# Patient Record
Sex: MALE
[De-identification: names, ages, dates, MRNs, and addresses within clinical notes are randomized per-mention and may not be internally consistent; named-entity substitution may affect disease eponyms.]

---

## 2017-02-07 ENCOUNTER — RX ONLY (RX ONLY)
Age: 52
End: 2017-02-07

## 2017-02-07 ENCOUNTER — DOCTOR'S OFFICE (OUTPATIENT)
Dept: URBAN - NONMETROPOLITAN AREA CLINIC 1 | Facility: CLINIC | Age: 52
Setting detail: OPHTHALMOLOGY
End: 2017-02-07
Payer: MEDICARE

## 2017-02-07 DIAGNOSIS — H04.122: ICD-10-CM

## 2017-02-07 DIAGNOSIS — H04.121: ICD-10-CM

## 2017-02-07 DIAGNOSIS — H25.013: ICD-10-CM

## 2017-02-07 PROCEDURE — 92004 COMPRE OPH EXAM NEW PT 1/>: CPT | Performed by: OPHTHALMOLOGY

## 2017-02-07 ASSESSMENT — REFRACTION_MANIFEST
OS_VA3: 20/
OU_VA: 20/
OD_VA3: 20/
OD_VA2: 20/
OD_VA2: 20/
OU_VA: 20/
OD_VA1: 20/
OD_VA1: 20/
OS_VA1: 20/
OU_VA: 20/
OD_VA3: 20/
OS_VA2: 20/
OS_VA3: 20/
OS_VA2: 20/
OS_VA1: 20/
OS_VA2: 20/
OS_VA3: 20/
OD_VA2: 20/
OS_VA1: 20/
OD_VA3: 20/
OD_VA1: 20/

## 2017-02-07 ASSESSMENT — VISUAL ACUITY
OS_BCVA: 20/30-2
OD_BCVA: 20/25-2

## 2017-02-07 ASSESSMENT — REFRACTION_CURRENTRX
OS_OVR_VA: 20/
OD_OVR_VA: 20/
OS_OVR_VA: 20/
OD_OVR_VA: 20/
OS_OVR_VA: 20/
OD_OVR_VA: 20/

## 2017-02-07 ASSESSMENT — SUPERFICIAL PUNCTATE KERATITIS (SPK)
OS_SPK: 1+
OD_SPK: 1+

## 2017-02-07 ASSESSMENT — CONFRONTATIONAL VISUAL FIELD TEST (CVF)
OS_FINDINGS: FULL
OD_FINDINGS: FULL

## 2017-02-07 ASSESSMENT — REFRACTION_AUTOREFRACTION
OD_SPHERE: +1.50
OS_CYLINDER: -0.50
OS_AXIS: 20
OD_CYLINDER: -0.50
OS_SPHERE: +0.75
OD_AXIS: 120

## 2017-02-07 ASSESSMENT — SPHEQUIV_DERIVED
OD_SPHEQUIV: 1.25
OS_SPHEQUIV: 0.5

## 2022-03-06 ENCOUNTER — APPOINTMENT (EMERGENCY)
Dept: RADIOLOGY | Facility: HOSPITAL | Age: 57
End: 2022-03-06
Payer: MEDICARE

## 2022-03-06 ENCOUNTER — APPOINTMENT (EMERGENCY)
Dept: CT IMAGING | Facility: HOSPITAL | Age: 57
End: 2022-03-06
Payer: MEDICARE

## 2022-03-06 ENCOUNTER — HOSPITAL ENCOUNTER (EMERGENCY)
Facility: HOSPITAL | Age: 57
Discharge: HOME | End: 2022-03-06
Attending: EMERGENCY MEDICINE | Admitting: EMERGENCY MEDICINE
Payer: MEDICARE

## 2022-03-06 VITALS
RESPIRATION RATE: 17 BRPM | DIASTOLIC BLOOD PRESSURE: 94 MMHG | TEMPERATURE: 98.2 F | SYSTOLIC BLOOD PRESSURE: 147 MMHG | OXYGEN SATURATION: 96 % | WEIGHT: 251.32 LBS | HEIGHT: 74 IN | HEART RATE: 85 BPM | BODY MASS INDEX: 32.25 KG/M2
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
## 2022-03-06 DIAGNOSIS — S22.39XA RIB FRACTURE: ICD-10-CM

## 2022-03-06 LAB
ANION GAP SERPL CALCULATED.3IONS-SCNC: 3 MMOL/L (ref 4–13)
BASOPHILS # BLD AUTO: 0.08 THOUSANDS/ΜL (ref 0–0.1)
BASOPHILS NFR BLD AUTO: 1 % (ref 0–1)
BUN SERPL-MCNC: 17 MG/DL (ref 5–25)
CALCIUM SERPL-MCNC: 8.7 MG/DL (ref 8.3–10.1)
CHLORIDE SERPL-SCNC: 100 MMOL/L (ref 100–108)
CO2 SERPL-SCNC: 29 MMOL/L (ref 21–32)
CREAT SERPL-MCNC: 0.97 MG/DL (ref 0.6–1.3)
EOSINOPHIL # BLD AUTO: 0.24 THOUSAND/ΜL (ref 0–0.61)
EOSINOPHIL NFR BLD AUTO: 3 % (ref 0–6)
ERYTHROCYTE [DISTWIDTH] IN BLOOD BY AUTOMATED COUNT: 12.9 % (ref 11.6–15.1)
GFR SERPL CREATININE-BSD FRML MDRD: 86 ML/MIN/1.73SQ M
GLUCOSE SERPL-MCNC: 99 MG/DL (ref 65–140)
HCT VFR BLD AUTO: 43 % (ref 36.5–49.3)
HGB BLD-MCNC: 14.4 G/DL (ref 12–17)
IMM GRANULOCYTES # BLD AUTO: 0.05 THOUSAND/UL (ref 0–0.2)
IMM GRANULOCYTES NFR BLD AUTO: 1 % (ref 0–2)
LYMPHOCYTES # BLD AUTO: 1.51 THOUSANDS/ΜL (ref 0.6–4.47)
LYMPHOCYTES NFR BLD AUTO: 17 % (ref 14–44)
MCH RBC QN AUTO: 29.6 PG (ref 26.8–34.3)
MCHC RBC AUTO-ENTMCNC: 33.5 G/DL (ref 31.4–37.4)
MCV RBC AUTO: 89 FL (ref 82–98)
MONOCYTES # BLD AUTO: 0.62 THOUSAND/ΜL (ref 0.17–1.22)
MONOCYTES NFR BLD AUTO: 7 % (ref 4–12)
NEUTROPHILS # BLD AUTO: 6.38 THOUSANDS/ΜL (ref 1.85–7.62)
NEUTS SEG NFR BLD AUTO: 71 % (ref 43–75)
NRBC BLD AUTO-RTO: 0 /100 WBCS
PLATELET # BLD AUTO: 263 THOUSANDS/UL (ref 149–390)
PMV BLD AUTO: 9.7 FL (ref 8.9–12.7)
POTASSIUM SERPL-SCNC: 4 MMOL/L (ref 3.5–5.3)
RBC # BLD AUTO: 4.86 MILLION/UL (ref 3.88–5.62)
SODIUM SERPL-SCNC: 132 MMOL/L (ref 136–145)
WBC # BLD AUTO: 8.88 THOUSAND/UL (ref 4.31–10.16)

## 2022-03-06 PROCEDURE — 99285 EMERGENCY DEPT VISIT HI MDM: CPT | Performed by: PHYSICIAN ASSISTANT

## 2022-03-06 PROCEDURE — 70450 CT HEAD/BRAIN W/O DYE: CPT

## 2022-03-06 PROCEDURE — 96374 THER/PROPH/DIAG INJ IV PUSH: CPT

## 2022-03-06 PROCEDURE — 36415 COLL VENOUS BLD VENIPUNCTURE: CPT | Performed by: PHYSICIAN ASSISTANT

## 2022-03-06 PROCEDURE — 74177 CT ABD & PELVIS W/CONTRAST: CPT

## 2022-03-06 PROCEDURE — 85025 COMPLETE CBC W/AUTO DIFF WBC: CPT | Performed by: PHYSICIAN ASSISTANT

## 2022-03-06 PROCEDURE — 71045 X-RAY EXAM CHEST 1 VIEW: CPT

## 2022-03-06 PROCEDURE — 80048 BASIC METABOLIC PNL TOTAL CA: CPT | Performed by: PHYSICIAN ASSISTANT

## 2022-03-06 PROCEDURE — 73030 X-RAY EXAM OF SHOULDER: CPT

## 2022-03-06 PROCEDURE — 99284 EMERGENCY DEPT VISIT MOD MDM: CPT

## 2022-03-06 PROCEDURE — 72125 CT NECK SPINE W/O DYE: CPT

## 2022-03-06 RX ORDER — LIDOCAINE 50 MG/G
1 PATCH TOPICAL ONCE
Status: DISCONTINUED | OUTPATIENT
Start: 2022-03-06 | End: 2022-03-06 | Stop reason: HOSPADM

## 2022-03-06 RX ORDER — FAMOTIDINE 20 MG/1
1 TABLET, FILM COATED ORAL 2 TIMES DAILY
COMMUNITY
Start: 2022-02-23

## 2022-03-06 RX ORDER — MORPHINE SULFATE 4 MG/ML
4 INJECTION, SOLUTION INTRAMUSCULAR; INTRAVENOUS ONCE
Status: COMPLETED | OUTPATIENT
Start: 2022-03-06 | End: 2022-03-06

## 2022-03-06 RX ORDER — AMANTADINE HYDROCHLORIDE 100 MG/1
100 TABLET ORAL 2 TIMES DAILY
COMMUNITY
Start: 2021-10-21

## 2022-03-06 RX ADMIN — MORPHINE SULFATE 4 MG: 4 INJECTION INTRAVENOUS at 19:20

## 2022-03-06 RX ADMIN — IOHEXOL 100 ML: 350 INJECTION, SOLUTION INTRAVENOUS at 19:09

## 2022-03-06 RX ADMIN — LIDOCAINE 1 PATCH: 50 PATCH TOPICAL at 19:21

## 2022-03-06 NOTE — ED PROVIDER NOTES
History  Chief Complaint   Patient presents with   [facility] Fall     pt arrives reporting a trip and fall down 4 steps  pt denies LOC, denies being on blood thinner  pt reports pain to bilateral arms, left rib, and mid back pain since event     64year old male presents emergency department for evaluation status post fall  Patient states he missed a step and fell down approximately 4 steps  Was outside on the deck  Reports he "dove" off the steps so he would avoid hitting them and landed in the grass  Reports left shoulder pain, left rib cage pain and back pain  Did hit head, no LOC  States he did have difficulty catching his breath after the fall  Was able to stand and ambulate after the fall and get himself into the car for his daughter to drive him to the ED  Has abrasion on left knee  Tetanus up to date per patient  Patient denies headache, dizziness, confusion, visual changes  He denies abdominal pain, nausea, vomiting  States he has extreme pain on left ribs, painful to take a deep breath  History provided by:  Patient  Fall  Mechanism of injury: fall    Injury location:  Head/neck, torso and shoulder/arm  Shoulder/arm injury location:  L shoulder  Torso injury location:  L flank  Incident location:  Outdoors  Time since incident:  30 minutes  Arrived directly from scene: yes    Fall:     Fall occurred:  Down stairs  Suspicion of alcohol use: no    Suspicion of drug use: no    Tetanus status:  Up to date  Associated symptoms: back pain    Associated symptoms: no abdominal pain, no blindness, no chest pain, no difficulty breathing, no headaches, no hearing loss, no loss of consciousness, no nausea, no neck pain, no seizures and no vomiting        Prior to Admission Medications   Prescriptions Last Dose Informant Patient Reported? Taking?    Amantadine HCl 100 MG tablet   Yes Yes   Sig: Take 100 mg by mouth 2 (two) times a day   diclofenac sodium (VOLTAREN) 50 mg EC tablet   Yes Yes   Sig: Take 50 mg by mouth   famotidine (PEPCID) 20 mg tablet   Yes Yes   Sig: Take 1 tablet by mouth 2 (two) times a day      Facility-Administered Medications: None       Past Medical History:   Diagnosis Date    Parkinson disease ([facility] [address] )     Skin cancer        Past Surgical History:   Procedure Laterality Date    DEEP BRAIN STIMULATOR PLACEMENT         History reviewed  No pertinent family history  I have reviewed and agree with the history as documented  E-Cigarette/Vaping     E-Cigarette/Vaping Substances     Social History     Tobacco Use    Smoking status: Never Smoker    Smokeless tobacco: Never Used   Substance Use Topics    Alcohol use: Yes    Drug use: Never       Review of Systems   Constitutional: Negative  HENT: Negative  Negative for hearing loss  Eyes: Negative for blindness  Respiratory: Negative  Cardiovascular: Negative  Negative for chest pain  Gastrointestinal: Negative  Negative for abdominal pain, nausea and vomiting  Musculoskeletal: Positive for arthralgias and back pain  Negative for neck pain  Skin: Positive for wound  Neurological: Negative  Negative for seizures, loss of consciousness and headaches  All other systems reviewed and are negative  Physical Exam  Physical Exam  Vitals and nursing note reviewed  Constitutional:       General: He is not in acute distress  Appearance: Normal appearance  He is normal weight  He is not ill-appearing, toxic-appearing or diaphoretic  HENT:      Head: Normocephalic and atraumatic  Nose: Nose normal       Mouth/Throat:      Mouth: Mucous membranes are moist       Pharynx: Oropharynx is clear  No oropharyngeal exudate or posterior oropharyngeal erythema  Eyes:      Conjunctiva/sclera: Conjunctivae normal       Pupils: Pupils are equal, round, and reactive to light  Cardiovascular:      Rate and Rhythm: Normal rate and regular rhythm     Pulmonary:      Effort: Pulmonary effort is normal       Breath sounds: Normal breath sounds  No wheezing, rhonchi or rales  Chest:      Chest wall: Tenderness present  Comments: Tenderness to palpation  Abdominal:      General: Abdomen is flat  Bowel sounds are normal  There is no distension  Tenderness: There is abdominal tenderness (LUQ pain)  There is no right CVA tenderness, left CVA tenderness or guarding  Musculoskeletal:         General: Tenderness present  No swelling or deformity  Normal range of motion  Cervical back: Normal and normal range of motion  Thoracic back: Swelling, tenderness and bony tenderness present  Lumbar back: Normal         Back:    Skin:     General: Skin is warm and dry  Capillary Refill: Capillary refill takes less than 2 seconds  Findings: No bruising, erythema or rash  Neurological:      General: No focal deficit present  Mental Status: He is alert and oriented to person, place, and time  Sensory: No sensory deficit  Motor: No weakness        Coordination: Coordination normal       Gait: Gait normal    Psychiatric:         Mood and Affect: Mood normal          Behavior: Behavior normal          Vital Signs  ED Triage Vitals   Temperature Pulse Respirations Blood Pressure SpO2   03/06/22 1750 03/06/22 1750 03/06/22 1750 03/06/22 1750 03/06/22 1750   98 4 °F (36 9 °C) 89 18 162/89 98 %      Temp Source Heart Rate Source Patient Position - Orthostatic VS BP Location FiO2 (%)   03/06/22 2027 03/06/22 1800 -- 03/06/22 2027 --   Oral Monitor  Right arm       Pain Score       03/06/22 1920       6           Vitals:    03/06/22 1800 03/06/22 1915 03/06/22 1945 03/06/22 2027   BP: 162/89 158/82 144/81 147/94   Pulse: 81 86 84 85         Visual Acuity  Visual Acuity      Most Recent Value   L Pupil Size (mm) 3   R Pupil Size (mm) 3          ED Medications  Medications   lidocaine (LIDODERM) 5 % patch 1 patch (1 patch Topical Medication Applied 3/6/22 1921)   morphine (PF) 4 mg/mL injection 4 mg (4 mg Intravenous Given 3/6/22 1920)   iohexol (OMNIPAQUE) 350 MG/ML injection (SINGLE-DOSE) 100 mL (100 mL Intravenous Given 3/6/22 1909)       Diagnostic Studies  Results Reviewed     Procedure Component Value Units Date/Time    Basic metabolic panel [[id number]]  (Abnormal) Collected: 03/06/22 1821    Lab Status: Final result Specimen: Blood from Arm, Left Updated: 03/06/22 1838     Sodium 132 mmol/L      Potassium 4 0 mmol/L      Chloride 100 mmol/L      CO2 29 mmol/L      ANION GAP 3 mmol/L      BUN 17 mg/dL      Creatinine 0 97 mg/dL      Glucose 99 mg/dL      Calcium 8 7 mg/dL      eGFR 86 ml/min/1 73sq m     Narrative:      [place] guidelines for Chronic Kidney Disease (CKD):     Stage 1 with normal or high GFR (GFR > 90 mL/min/1 73 square meters)    Stage 2 Mild CKD (GFR = 60-89 mL/min/1 73 square meters)    Stage 3A Moderate CKD (GFR = 45-59 mL/min/1 73 square meters)    Stage 3B Moderate CKD (GFR = 30-44 mL/min/1 73 square meters)    Stage 4 Severe CKD (GFR = 15-29 mL/min/1 73 square meters)    Stage 5 End Stage CKD (GFR <15 mL/min/1 73 square meters)  Note: GFR calculation is accurate only with a steady state creatinine    CBC and differential [[id number]] Collected: 03/06/22 1821    Lab Status: Final result Specimen: Blood from Arm, Left Updated: 03/06/22 1829     WBC 8 88 Thousand/uL      RBC 4 86 Million/uL      Hemoglobin 14 4 g/dL      Hematocrit 43 0 %      MCV 89 fL      MCH 29 6 pg      MCHC 33 5 g/dL      RDW 12 9 %      MPV 9 7 fL      Platelets 414 Thousands/uL      nRBC 0 /100 WBCs      Neutrophils Relative 71 %      Immat GRANS % 1 %      Lymphocytes Relative 17 %      Monocytes Relative 7 %      Eosinophils Relative 3 %      Basophils Relative 1 %      Neutrophils Absolute 6 38 Thousands/µL      Immature Grans Absolute 0 05 Thousand/uL      Lymphocytes Absolute 1 51 Thousands/µL      Monocytes Absolute 0 62 Thousand/µL      Eosinophils Absolute 0 24 Thousand/µL Basophils Absolute 0 08 Thousands/µL                  CT abdomen pelvis with contrast   Final Result by Noaln Mane MD (03/06 1948)   No evidence of acute posttraumatic injury  Workstation performed: [id number]         CT head without contrast   Final Result by Lisa Gallardo MD (03/06 2026)      No acute intracranial abnormality  Workstation performed: [id number]         CT spine cervical without contrast   Final Result by Lisa Gallardo MD (03/06 2007)      Right first rib fracture posteriorly  The study was marked in [facility] for immediate notification  Workstation performed: [id number]         XR chest 1 view portable    (Results Pending)   XR shoulder 2+ views LEFT    (Results Pending)              Procedures  Procedures         ED Course  ED Course as of 03/06/22 2040   Sun Mar 06, 2022   8669 ED interpretation of x-ray negative for acute osseous injury or pneumothorax   1857 ED interpretation shoulder negative for acute osseous injury   1954 CT abd: No evidence of acute posttraumatic injury  2011 CT c spine: Right first rib fracture posteriorly  2028 CT head: No acute intracranial abnormality  2029 I discussed all results and findings with the patient  We discussed symptomatic treatment and appropriate follow-up with PCP  We discussed strict return precautions and he verbalized understanding  We discussed the importance of taking deep inspirations and patient was sent home with incentive spirometer  2029 Patient remained well emergency department and was discharged             MDM  Number of Diagnoses or Management Options  Fall, initial encounter: new and requires workup  Rib fracture: new and requires workup  Diagnosis management comments: 64year old male presents to the emergency department for evaluation status post fall  Vitals and medical record reviewed  On exam he had left-sided rib tenderness palpation  Left-sided back tenderness    Neuro exam within normal limits  Head atraumatic despite head strike  Patient denies LOC  CT head negative for acute intracranial abnormality  CT C-spine noted for right 1st rib fracture  CT chest abdomen pelvis negative for acute traumatic injury  Patient's pain was controlled with morphine  We discussed importance of deep inspiration avoid pneumonia patient was discharged home with incentive spirometer  He agreed to follow-up with PCP and return with new or worsening symptoms  Patient was clinically and hemodynamically stable for discharge       Amount and/or Complexity of Data Reviewed  Clinical lab tests: ordered and reviewed  Tests in the radiology section of CPT®: ordered and reviewed  Review and summarize past medical records: yes  Independent visualization of images, tracings, or specimens: yes        Disposition  Final diagnoses:   Fall, initial encounter   Rib fracture     Time reflects when diagnosis was documented in both MDM as applicable and the Disposition within this note     Time User Action Codes Description Comment    3/6/2022  8:29 PM Meme Maldonado [L96  YKUG] Fall, initial encounter     3/6/2022  8:29 PM Padilla Hardin [S22 39XA] Rib fracture       ED Disposition     ED Disposition Condition Date/Time Comment    Discharge Stable [place] Mar 6, 2022  8:29 PM Daisey Prader discharge to home/self care  Follow-up Information     Follow up With Specialties Details Why [phone] Neno Lackey MD [facility]   Giorgi Abrams [phone]uston  [phone]            Patient's Medications   Discharge Prescriptions    No medications on file       No discharge procedures on file      PDMP Review     None          ED Provider  Electronically Signed by           Isaac Claire PA-C  03/06/22 3564

## 2022-03-07 NOTE — DISCHARGE INSTRUCTIONS
Please return to the emergency department with new or worsening symptoms  Please alternate between Tylenol and ibuprofen as needed for pain control  Please follow-up with your family doctor    Please continue to take deep inspirations to avoid developing pneumonia - using incentive spirometer

## 2022-03-07 NOTE — ED NOTES
Incentive spirometer given to patient with instructions on use  Patient verbalized understanding and demonstrated use  Becca Huerta RN  03/06/22 9222

## 2024-05-16 ENCOUNTER — TELEPHONE (OUTPATIENT)
Age: 59
End: 2024-05-16

## 2024-05-16 NOTE — TELEPHONE ENCOUNTER
Caller: Patient    Doctor/Office: na    Call regarding :  back pain     Call was transferred to: Comp Spine

## 2024-05-22 NOTE — PROGRESS NOTES
PT Evaluation     Today's date: 2024  Patient name: Steve Stevenson  : 1965  MRN: [id number]  Referring provider: Mir Mercedes PT  Dx:   Encounter Diagnosis     ICD-10-CM    1. Chronic bilateral low back pain without sciatica  M54.50     G89.29                      Assessment  Impairments: abnormal gait, abnormal or restricted ROM, activity intolerance, impaired balance, impaired physical strength, lacks appropriate home exercise program, pain with function and poor posture     Assessment details: PT notes the patient with decrease ROM and strength t/o the lumbar spine as well as the bilateral hip and LE with trunk/core weakness leading to increase pain levels and functional limitations with need for course of skilled therapy for 6 weeks with focus on lumbar stabilization, manual therapy, posture, analgesic modalities, and HEP.   Understanding of Dx/Px/POC: good     Prognosis: fair    Goals  ST.  Initiate HEP  2.  Decrease pain levels by 25-50%   3.  Increase ROM by 25-50%   4.  Increase strength by 1-2 mm grades  LT.  Improve spinal stability with increase trunk/core strength   2.  Decrease limitations with standing and walking  3.  Decrease limitations with trunk movement, lifting and carrying  4.  Decrease limitations with transfers and ADL  5.  DC with HEP    Plan  Patient would benefit from: PT eval and skilled physical therapy  Planned modality interventions: thermotherapy: hydrocollator packs    Planned therapy interventions: manual therapy, neuromuscular re-education, patient/caregiver education, postural training, self care, strengthening, stretching, therapeutic exercise, IADL retraining, home exercise program, graded exercise, gait training, flexibility and balance    Frequency: 2x week  Duration in weeks: 6  Treatment plan discussed with: patient  Plan details: PT notes review of POC and findings with the patient who is in agreement with PT recommendations of course of  skilled therapy.         Subjective Evaluation    History of Present Illness  Mechanism of injury: Patient reports dealing with chronic LBP for 2+ year secondary to degenerative changes t/o the lumbar spine.  Patient reports a trial of chiropractor treatments with minimal to no change in status.  Patient spoke with PCP about LB issues and was referred to OPPT through [facility] to address LBP.  Patient reports difficulty with reaching, lifting, carrying, and standing with limitations with work duties at [employer].  Patient with significant PMH of Parkinson's Disease with Deep Brain Stimulator, GERD, and skin cancer.   Patient Goals  Patient goals for therapy: decreased pain, increased motion, improved balance, increased strength and independence with ADLs/IADLs    Pain  Current pain ratin  At best pain ratin  At worst pain rating: 10  Location: Lumbar Spine  Quality: dull ache, discomfort, tight and pulling  Aggravating factors: walking, standing and lifting    Treatments  Previous treatment: chiropractic  Current treatment: physical therapy        Objective     Postural Observations  Seated posture: fair  Standing posture: fair    Additional Postural Observation Details  FB of trunk with standing and walking activities     Palpation   Left   Muscle spasm in the erector spinae and lumbar paraspinals.   Tenderness of the erector spinae and lumbar paraspinals.     Right   Muscle spasm in the erector spinae, lumbar paraspinals and quadratus lumborum.   Tenderness of the erector spinae, lumbar paraspinals and quadratus lumborum.     Tenderness     Left Hip   Tenderness in the PSIS. No tenderness in the greater trochanter, iliac crest and sacroiliac joint.     Right Hip   Tenderness in the PSIS. No tenderness in the greater trochanter, iliac crest and sacroiliac joint.     Neurological Testing     Reflexes   Left   Patellar (L4): trace (1+)  Achilles (S1): trace (1+)    Right   Patellar (L4):  trace (1+)  Achilles (S1): trace (1+)    Active Range of Motion     Lumbar   Flexion:  WFL  Extension: 5 degrees  with pain  Left lateral flexion: 10 degrees    with pain  Right lateral flexion: 12 degrees  with pain  Left rotation: 11 degrees  with pain  Right rotation: 14 degrees  with pain  Left Hip   Flexion: 52 degrees   Extension: -4 degrees   External rotation (90/90): 14 degrees   Internal rotation (90/90): 6 degrees     Right Hip   Flexion: 56 degrees   Extension: -2 degrees   External rotation (90/90): 13 degrees   Internal rotation (90/90): 9 degrees   Left Knee   Normal active range of motion    Right Knee   Normal active range of motion    Additional Active Range of Motion Details  Trunk core strength abdominals of 2+/5 and lumbar paraspinals of 3/5     Passive Range of Motion   Left Hip   Flexion: 54 degrees   Extension: 0 degrees   External rotation (90/90): 17 degrees   Internal rotation (90/90): 11 degrees     Right Hip   Flexion: 60 degrees   Extension: 0 degrees   External rotation (90/90): 18 degrees   Internal rotation (90/90): 10 degrees     Joint Play     Hypomobile: T12, L1, L2, L3, L4, L5 and S1     Pain: T12, L1, L2, L3, L4, L5 and S1     Strength/Myotome Testing     Left Hip   Planes of Motion   Flexion: 4+  Extension: 4+  Abduction: 4+  Adduction: 4+  External rotation: 4  Internal rotation: 4    Right Hip   Planes of Motion   Flexion: 4  Extension: 4+  Abduction: 4-  Adduction: 4+  External rotation: 4-  Internal rotation: 4-    Left Knee   Flexion: 4+  Extension: 4  Quadriceps contraction: good    Right Knee   Flexion: 4+  Extension: 4  Quadriceps contraction: good    Tests     Lumbar     Left   Negative slump test.     Right   Negative slump test.     Left Hip   Negative ERLINDA, FADIR and long sit.   Carlos: Positive.   Modified Carlos: Positive.   90/90 SLR: Positive.   SLR: Positive.     Right Hip   Negative ERLINDA, FADIR and long sit.   Carlos: Positive.   Modified Carlos: Positive.    90/90 SLR: Positive.   SLR: Positive.     Ambulation     Observational Gait   Gait: crouched   Decreased walking speed and stride length.     Additional Observational Gait Details  PT notes FB of trunk, decrease hip and knee flex, decrease step length, decrease toe clearance, decrease push off, and decrease erika with rating of fair+ with static and dynamic balance              Precautions:  PMH Parkinson's Disease with Deep Brain Stimulator, GERD, Skin Cancer  POC 6/23/24      Manuals 5/23       Bilateral HS, hip ABD, quad, gastroc and piriformis with manual hip traction                                 Neuro Re-Ed        Stand OAL        Stand Lumbar Extension Against Wall         TB Trunk Rotation         Bridge with ABD         Supine Tball ABD Crunch         Front and lateral lunge                 Ther Ex        SRC for ROM and strength         Supine Tball LTR         Tball Table QL Stretch                                         HEP update/review  15 min        Ther Activity                        Gait Training                        Modalities        MHP PRN

## 2024-05-23 ENCOUNTER — EVALUATION (OUTPATIENT)
Dept: PHYSICAL THERAPY | Facility: CLINIC | Age: 59
End: 2024-05-23
Payer: MEDICARE

## 2024-05-23 DIAGNOSIS — G89.29 CHRONIC BILATERAL LOW BACK PAIN WITHOUT SCIATICA: Primary | ICD-10-CM

## 2024-05-23 DIAGNOSIS — M54.50 CHRONIC BILATERAL LOW BACK PAIN WITHOUT SCIATICA: Primary | ICD-10-CM

## 2024-05-23 PROCEDURE — 97162 PT EVAL MOD COMPLEX 30 MIN: CPT | Performed by: PHYSICAL THERAPIST

## 2024-05-23 PROCEDURE — 97535 SELF CARE MNGMENT TRAINING: CPT | Performed by: PHYSICAL THERAPIST

## 2024-05-30 ENCOUNTER — OFFICE VISIT (OUTPATIENT)
Dept: PHYSICAL THERAPY | Facility: CLINIC | Age: 59
End: 2024-05-30
Payer: MEDICARE

## 2024-05-30 DIAGNOSIS — M54.50 CHRONIC BILATERAL LOW BACK PAIN WITHOUT SCIATICA: Primary | ICD-10-CM

## 2024-05-30 DIAGNOSIS — G89.29 CHRONIC BILATERAL LOW BACK PAIN WITHOUT SCIATICA: Primary | ICD-10-CM

## 2024-05-30 PROCEDURE — 97110 THERAPEUTIC EXERCISES: CPT | Performed by: PHYSICAL THERAPIST

## 2024-05-30 PROCEDURE — 97112 NEUROMUSCULAR REEDUCATION: CPT | Performed by: PHYSICAL THERAPIST

## 2024-05-30 NOTE — PROGRESS NOTES
Daily Note     Today's date: 2024  Patient name: Steve Stevenson  : 1965  MRN: [id number]  Referring provider: Mir Mercedes PT  Dx:   Encounter Diagnosis     ICD-10-CM    1. Chronic bilateral low back pain without sciatica  M54.50     G89.29                      Subjective: Pt reports that he hurt his back over the weekend and now has pain going down the leg.       Objective: See treatment diary below      Assessment: Tolerated treatment fair. Patient would benefit from continued PT. Pt did present with a shift from hurting himself over the weekend. Self lateral shift correct increased low back and leg sx's. Standing extension no worse no better. Trailed DONNIE over 2 pillows at which point abolished leg sx's. Pt was instructed on performing DONNIE over pillows at home to manage sx's.      Plan: Continue per plan of care.  Progress treatment as tolerated.       Precautions:  PMH Parkinson's Disease with Deep Brain Stimulator, GERD, Skin Cancer  POC 24      Manuals       Bilateral HS, hip ABD, quad, gastroc and piriformis with manual hip traction                                 Neuro Re-Ed        Stand OAL        Stand Lumbar Extension Against Wall   Against table x20      TB Trunk Rotation         Bridge with ABD   x10      Supine Tball ABD Crunch   5s x20      Front and lateral lunge                 Ther Ex        SRC for ROM and strength   10 min      Supine Tball LTR   15 ea side       Tball Table QL Stretch         Self lateral correct at wall both side   Increase worse       Prone on elbows over 2 pillows  3 min                       HEP update/review  15 min        Ther Activity                        Gait Training                        Modalities        MHP PRN

## 2024-05-31 NOTE — PROGRESS NOTES
Daily Note     Today's date: 2024  Patient name: Steve Stevenson  : 1965  MRN: [id number]  Referring provider: Mir Mercedes, LIDIA  Dx:   Encounter Diagnosis     ICD-10-CM    1. Chronic bilateral low back pain without sciatica  M54.50     G89.29                      Subjective: ***      Objective: See treatment diary below      Assessment: Tolerated treatment {Tolerated treatment :[id number]}.  PT notes continuation of decrease ROM and strength t/o the lumbar spine as well as the bilateral hip and LE with trunk/core weakness and LE radicular symptoms with need for continuation of skilled therapy.        Plan: Continue per plan of care.      Precautions:  PMH Parkinson's Disease with Deep Brain Stimulator, GERD, Skin Cancer  POC 24      Manuals 5/23 5/30 6/3     Bilateral HS, hip ABD, quad, gastroc and piriformis with manual hip traction                                 Neuro Re-Ed        Stand OAL        Stand Lumbar Extension Against Wall   Against table x20      TB Trunk Rotation         Bridge with ABD   x10      Supine Tball ABD Crunch   5s x20      Front and lateral lunge                 Ther Ex        SRC for ROM and strength   10 min      Supine Tball LTR   15 ea side       Tball Table QL Stretch         Self lateral correct at wall both side   Increase worse       Prone on elbows over 2 pillows  3 min                       HEP update/review  15 min        Ther Activity                        Gait Training                        Modalities        MHP PRN

## 2024-06-03 ENCOUNTER — APPOINTMENT (OUTPATIENT)
Dept: PHYSICAL THERAPY | Facility: CLINIC | Age: 59
End: 2024-06-03
Payer: MEDICARE

## 2024-06-06 ENCOUNTER — OFFICE VISIT (OUTPATIENT)
Dept: PHYSICAL THERAPY | Facility: CLINIC | Age: 59
End: 2024-06-06
Payer: MEDICARE

## 2024-06-06 DIAGNOSIS — M54.50 CHRONIC BILATERAL LOW BACK PAIN WITHOUT SCIATICA: Primary | ICD-10-CM

## 2024-06-06 DIAGNOSIS — G89.29 CHRONIC BILATERAL LOW BACK PAIN WITHOUT SCIATICA: Primary | ICD-10-CM

## 2024-06-06 PROCEDURE — 97110 THERAPEUTIC EXERCISES: CPT

## 2024-06-06 PROCEDURE — 97112 NEUROMUSCULAR REEDUCATION: CPT

## 2024-06-06 NOTE — PROGRESS NOTES
Daily Note     Today's date: 2024  Patient name: Steve Stevenson  : 1965  MRN: [id number]  Referring provider: Mir Mercedes, [facility]  Dx:   Encounter Diagnosis     ICD-10-CM    1. Chronic bilateral low back pain without sciatica  M54.50     G89.29                      Subjective: Pt reports he was having pain in his leg after last sesison but was placed on steroid pack. Has been feeling slightly better with this      Objective: See treatment diary below      Assessment:  Pt tolerated treatment session better than previous session. Prone without pillows tolerated well without radicular symptoms. He was then able to tolerate DONNIE for almost a minute before symptoms in RLE began down to ankle. HEP updated. Pt would benefit from continued OP PT services.       Plan: Continue per plan of care.      Precautions:  PMH Parkinson's Disease with Deep Brain Stimulator, GERD, Skin Cancer  POC 24      Manuals      Bilateral HS, hip ABD, quad, gastroc and piriformis with manual hip traction                                 Neuro Re-Ed        Stand OAL        Stand Lumbar Extension Against Wall   Against table x20 Against table 2x10     TB Trunk Rotation         Bridge with ABD   x10 x10     Supine Tball ABD Crunch   5s x20 5s x20     PPT   5x :05      Front and lateral lunge                 Ther Ex        SRC for ROM and strength   10 min 10 min     Supine Tball LTR   15 ea side  15 ea side      Tball Table QL Stretch         Self lateral correct at wall both side   Increase worse       Prone on elbows over 2 pillows  3 min  Over 1 pillow 2 henrry     Prone lying   1 min, DONNIE 1' with start of radicular sx's             HEP update/review  15 min   5 min     Ther Activity                        Gait Training                        Modalities        MHP PRN

## 2024-06-10 ENCOUNTER — OFFICE VISIT (OUTPATIENT)
Dept: PHYSICAL THERAPY | Facility: CLINIC | Age: 59
End: 2024-06-10
Payer: MEDICARE

## 2024-06-10 DIAGNOSIS — G89.29 CHRONIC BILATERAL LOW BACK PAIN WITHOUT SCIATICA: Primary | ICD-10-CM

## 2024-06-10 DIAGNOSIS — M54.50 CHRONIC BILATERAL LOW BACK PAIN WITHOUT SCIATICA: Primary | ICD-10-CM

## 2024-06-10 PROCEDURE — 97110 THERAPEUTIC EXERCISES: CPT

## 2024-06-10 PROCEDURE — 97112 NEUROMUSCULAR REEDUCATION: CPT

## 2024-06-10 NOTE — PROGRESS NOTES
Daily Note     Today's date: 6/10/2024  Patient name: Steve Stevenson  : 1965  MRN: [id number]  Referring provider: Mir Mercedes, [facility]  Dx:   Encounter Diagnosis     ICD-10-CM    1. Chronic bilateral low back pain without sciatica  M54.50     G89.29                      Subjective: Pt reports he continues with pain down his leg and into his shin      Objective: See treatment diary below      Assessment:  Pt tolerated treatment session fair. Unable to tolerate prone activity at all this session due to worsening radicular symptoms. Able to tolerate trunk rotation and knees to chest in supine position within smaller ranges. Will re-evaluate next session. Pt notes no worsening of sx's post session. Pt would benefit from continued OP PT services.       Plan: Continue per plan of care.      Precautions:  PMH Parkinson's Disease with Deep Brain Stimulator, GERD, Skin Cancer  POC 24      Manuals  6/10    Bilateral HS, hip ABD, quad, gastroc and piriformis with manual hip traction                                 Neuro Re-Ed        Stand OAL        Stand Lumbar Extension Against Wall   Against table x20 Against table 2x10 Against table 2x10    TB Trunk Rotation         Bridge with ABD   x10 x10 x10    Supine Tball ABD Crunch   5s x20 5s x20 Hold    PPT   5x :05  Held    Front and lateral lunge                 Ther Ex        SRC for ROM and strength   10 min 10 min 10 min    Supine Tball LTR   15 ea side  15 ea side  10x :05 bilat    Supine pball DKTC    10x :05    Self lateral correct at wall both side   Increase worse       Prone on elbows over 2 pillows  3 min  Over 1 pillow 2 min 1 min then radicular sx's    Prone lying   1 min, DONNIE 1' with start of radicular sx's Held            HEP update/review  15 min   5 min     Ther Activity                        Gait Training                        Modalities        MHP PRN

## 2024-06-12 NOTE — PROGRESS NOTES
PT Re-Evaluation     Today's date: 2024  Patient name: Steve Stevenson  : 1965  MRN: [id number]  Referring provider: Mir Mercedes PT  Dx:   Encounter Diagnosis     ICD-10-CM    1. Chronic bilateral low back pain without sciatica  M54.50     G89.29                      Assessment  Impairments: abnormal gait, abnormal or restricted ROM, activity intolerance, impaired balance, impaired physical strength, lacks appropriate home exercise program, pain with function and poor posture     Assessment details: PT notes the patient with continuation of decrease ROM and strength t/o the lumbar spine as well as the bilateral hip and LE with trunk/core weakness leading to increase pain levels and functional limitations with need for continuation of skilled therapy for 4 weeks with focus on lumbar stabilization, manual therapy, posture, analgesic modalities, and transition to HEP.   Understanding of Dx/Px/POC: good     Prognosis: fair    Goals  ST.  Initiate HEP-MET  2.  Decrease pain levels by 25-50%-Progressing    3.  Increase ROM by 25-50%-Progressing    4.  Increase strength by 1-2 mm grades-Progressing   LT.  Improve spinal stability with increase trunk/core strength-Progressing    2.  Decrease limitations with standing and walking-Progressing   3.  Decrease limitations with trunk movement, lifting and carrying-Progressing   4.  Decrease limitations with transfers and ADL-Progressing   5.  DC with HEP-Progressing     Plan  Patient would benefit from: skilled physical therapy  Planned modality interventions: thermotherapy: hydrocollator packs    Planned therapy interventions: manual therapy, neuromuscular re-education, patient/caregiver education, postural training, self care, strengthening, stretching, therapeutic exercise, IADL retraining, home exercise program, graded exercise, gait training, flexibility and balance    Frequency: 2x week  Duration in weeks: 4  Treatment plan discussed with:  patient  Plan details: Transition to [facility].          Subjective Evaluation    History of Present Illness  Mechanism of injury: Presently the patient has attended 5 sessions of skilled therapy and feels slightly better since the start of therapy.  Patient reports the right LE is calming down but continuation of right hip pain and tightness with LBP with limitations with standing, transfers, trunk movement, ADL and lifting with need for continuation of skilled therapy.   Patient Goals  Patient goals for therapy: decreased pain, increased motion, improved balance, increased strength and independence with ADLs/IADLs    Pain  Current pain ratin  At best pain ratin  At worst pain rating: 10  Location: Lumbar Spine and Right LE  Quality: dull ache, discomfort, tight and pulling  Aggravating factors: walking, standing and lifting    Treatments  Previous treatment: chiropractic  Current treatment: physical therapy        Objective     Postural Observations  Seated posture: fair  Standing posture: fair    Additional Postural Observation Details  FB of trunk with standing and walking activities     Palpation   Left   Muscle spasm in the erector spinae and lumbar paraspinals.   Tenderness of the erector spinae and lumbar paraspinals.     Right   Muscle spasm in the erector spinae, lumbar paraspinals and quadratus lumborum.   Tenderness of the erector spinae, lumbar paraspinals and quadratus lumborum.     Tenderness     Left Hip   Tenderness in the PSIS. No tenderness in the greater trochanter, iliac crest and sacroiliac joint.     Right Hip   Tenderness in the PSIS. No tenderness in the greater trochanter, iliac crest and sacroiliac joint.     Neurological Testing     Reflexes   Left   Patellar (L4): trace (1+)  Achilles (S1): trace (1+)    Right   Patellar (L4): trace (1+)  Achilles (S1): trace (1+)    Active Range of Motion     Lumbar   Flexion:  WFL  Extension: 11 degrees  with pain  Left lateral flexion: 15 degrees     with pain  Right lateral flexion: 14 degrees  with pain  Left rotation: 13 degrees  with pain  Right rotation: 14 degrees  with pain  Left Hip   Flexion: 52 degrees   Extension: -4 degrees   External rotation (90/90): 14 degrees   Internal rotation (90/90): 6 degrees     Right Hip   Flexion: 56 degrees   Extension: -2 degrees   External rotation (90/90): 13 degrees   Internal rotation (90/90): 9 degrees   Left Knee   Normal active range of motion    Right Knee   Normal active range of motion    Additional Active Range of Motion Details  Trunk core strength abdominals of 3/5 and lumbar paraspinals of 3/5     Passive Range of Motion   Left Hip   Flexion: 54 degrees   Extension: 0 degrees   External rotation (90/90): 17 degrees   Internal rotation (90/90): 11 degrees     Right Hip   Flexion: 60 degrees   Extension: 0 degrees   External rotation (90/90): 18 degrees   Internal rotation (90/90): 10 degrees     Joint Play     Hypomobile: T12, L1, L2, L3, L4, L5 and S1     Pain: T12, L1, L2, L3, L4, L5 and S1     Strength/Myotome Testing     Left Hip   Planes of Motion   Flexion: 4+  Extension: 4+  Abduction: 4+  Adduction: 4+  External rotation: 4  Internal rotation: 4+    Right Hip   Planes of Motion   Flexion: 4  Extension: 4+  Abduction: 4  Adduction: 4+  External rotation: 4-  Internal rotation: 4    Left Knee   Flexion: 4+  Extension: 4  Quadriceps contraction: good    Right Knee   Flexion: 4+  Extension: 4  Quadriceps contraction: good    Tests     Lumbar     Left   Negative slump test.     Right   Negative slump test.     Left Hip   Negative ERLINDA, FADIR and long sit.   Carlos: Positive.   Modified Acrlos: Positive.   90/90 SLR: Positive.   SLR: Positive.     Right Hip   Negative ERLINDA, FADIR and long sit.   Carlos: Positive.   Modified Carlos: Positive.   90/90 SLR: Positive.   SLR: Positive.     Ambulation     Observational Gait   Gait: crouched   Decreased walking speed and stride length.     Additional  "Observational Gait Details  PT notes FB of trunk, decrease hip and knee flex, decrease step length, decrease toe clearance, decrease push off, and decrease erika with rating of fair+ with static and dynamic balance              Precautions:  PMH Parkinson's Disease with Deep Brain Stimulator, GERD, Skin Cancer  POC 7/13/24      Manuals 5/23 5/30 6/6 6/10 6/13   Bilateral HS, hip ABD, quad, gastroc and piriformis with manual hip traction      15 min with lumbar PA mobs and manual lumbar traction                            Neuro Re-Ed        Stand OAL        Stand Lumbar Extension Against Wall   Against table x20 Against table 2x10 Against table 2x10 Against Wall  10x5\" Hold    TB Trunk Rotation         Bridge with ABD   x10 x10 x10 2x10  Bridge Only    Supine Tball ABD Crunch   5s x20 5s x20 Hold NT    PPT   5x :05  Held 10x5\" Hold    Front and lateral lunge                 Ther Ex        SRC for ROM and strength   10 min 10 min 10 min 10 min L3    Supine Tball LTR   15 ea side  15 ea side  10x :05 bilat 10x5\" Hold Bilat    Supine pball DKTC    10x :05 NT   Self lateral correct at wall both side   Increase worse       Prone on elbows over 2 pillows  3 min  Over 1 pillow 2 min 1 min then radicular sx's DONNIE No Pillows   1 min    Prone lying   1 min, DONNIE 1' with start of radicular sx's Held            HEP update/review  15 min   5 min     Ther Activity                        Gait Training                        Modalities        MHP PRN     15 Min LB in seated                     "

## 2024-06-13 ENCOUNTER — EVALUATION (OUTPATIENT)
Dept: PHYSICAL THERAPY | Facility: CLINIC | Age: 59
End: 2024-06-13
Payer: MEDICARE

## 2024-06-13 DIAGNOSIS — M54.50 CHRONIC BILATERAL LOW BACK PAIN WITHOUT SCIATICA: Primary | ICD-10-CM

## 2024-06-13 DIAGNOSIS — G89.29 CHRONIC BILATERAL LOW BACK PAIN WITHOUT SCIATICA: Primary | ICD-10-CM

## 2024-06-13 PROCEDURE — 97110 THERAPEUTIC EXERCISES: CPT | Performed by: PHYSICAL THERAPIST

## 2024-06-13 PROCEDURE — 97140 MANUAL THERAPY 1/> REGIONS: CPT | Performed by: PHYSICAL THERAPIST

## 2024-06-13 PROCEDURE — 97112 NEUROMUSCULAR REEDUCATION: CPT | Performed by: PHYSICAL THERAPIST

## 2024-06-17 ENCOUNTER — OFFICE VISIT (OUTPATIENT)
Dept: PHYSICAL THERAPY | Facility: CLINIC | Age: 59
End: 2024-06-17
Payer: MEDICARE

## 2024-06-17 DIAGNOSIS — M54.50 CHRONIC BILATERAL LOW BACK PAIN WITHOUT SCIATICA: Primary | ICD-10-CM

## 2024-06-17 DIAGNOSIS — G89.29 CHRONIC BILATERAL LOW BACK PAIN WITHOUT SCIATICA: Primary | ICD-10-CM

## 2024-06-17 PROCEDURE — 97140 MANUAL THERAPY 1/> REGIONS: CPT

## 2024-06-17 PROCEDURE — 97110 THERAPEUTIC EXERCISES: CPT

## 2024-06-17 PROCEDURE — 97112 NEUROMUSCULAR REEDUCATION: CPT

## 2024-06-17 NOTE — PROGRESS NOTES
"Daily Note     Today's date: 2024  Patient name: Steve Stevenson  : 1965  MRN: [id number]  Referring provider: Mir Mercedes, [facility]  Dx:   Encounter Diagnosis     ICD-10-CM    1. Chronic bilateral low back pain without sciatica  M54.50     G89.29                      Subjective: Pt reports his leg felt fairly decent over the weekend      Objective: See treatment diary below      Assessment:  Pt tolerated treatment session fairly well. Able to tolerate DONNIE x2 mins with minimal sx's to R hip. Bridges making sx's worse but able to complete one set. Pt would benefit from continued OP PT services.    Plan: Continue per plan of care.      Precautions:  PMH Parkinson's Disease with Deep Brain Stimulator, GERD, Skin Cancer  POC 24      Manuals 5/23 5/30 6/6 6/10 6/13   Bilateral HS, hip ABD, quad, gastroc and piriformis with manual hip traction  15 min with lumbar PA mobs and manual lumbar traction     15 min with lumbar PA mobs and manual lumbar traction                            Neuro Re-Ed        Stand OAL        Stand Lumbar Extension Against Wall  NT Against table x20 Against table 2x10 Against table 2x10 Against Wall  10x5\" Hold    TB Trunk Rotation         Bridge with ABD  x10 x10 x10 x10 2x10  Bridge Only    Supine Tball ABD Crunch   5s x20 5s x20 Hold NT    PPT 10x5\" Hold   5x :05  Held 10x5\" Hold    Front and lateral lunge                 Ther Ex        SRC for ROM and strength  10 min L3  10 min 10 min 10 min 10 min L3    Supine Tball LTR  10x5\" Hold Bilat  15 ea side  15 ea side  10x :05 bilat 10x5\" Hold Bilat    Supine pball DKTC    10x :05 NT   Self lateral correct at wall both side   Increase worse       Prone on elbows over 2 pillows DONNIE No Pillows   2 min 3 min  Over 1 pillow 2 min 1 min then radicular sx's DONNIE No Pillows   1 min    Prone lying   1 min, DONNIE 1' with start of radicular sx's Held            HEP update/review    5 min     Ther Activity                        Gait Training    "                     Modalities        MHP 15 Min LB in seated     15 Min LB in seated

## 2024-06-20 ENCOUNTER — APPOINTMENT (OUTPATIENT)
Dept: PHYSICAL THERAPY | Facility: CLINIC | Age: 59
End: 2024-06-20
Payer: MEDICARE

## 2024-06-20 DIAGNOSIS — R20.0 ANESTHESIA OF SKIN: ICD-10-CM

## 2024-06-20 DIAGNOSIS — R20.2 PARESTHESIA OF SKIN: ICD-10-CM

## 2024-06-20 DIAGNOSIS — M47.816 SPONDYLOSIS WITHOUT MYELOPATHY OR RADICULOPATHY, LUMBAR REGION: ICD-10-CM

## 2024-06-20 DIAGNOSIS — M51.36 OTHER INTERVERTEBRAL DISC DEGENERATION, LUMBAR REGION: ICD-10-CM

## 2024-06-20 DIAGNOSIS — M54.16 RADICULOPATHY, LUMBAR REGION: ICD-10-CM

## 2024-06-22 NOTE — PROGRESS NOTES
PT notes the patient contacted clinic and informed PT that MD was stopping therapy secondary to possible fracture/crushed vertebra in the lumbar spine with need for further testing.  PT will comply with MD orders and DC with HEP.

## 2024-06-24 ENCOUNTER — APPOINTMENT (OUTPATIENT)
Dept: PHYSICAL THERAPY | Facility: CLINIC | Age: 59
End: 2024-06-24
Payer: MEDICARE

## 2024-06-27 ENCOUNTER — HOSPITAL ENCOUNTER (OUTPATIENT)
Dept: CT IMAGING | Facility: HOSPITAL | Age: 59
End: 2024-06-27
Payer: MEDICARE

## 2024-06-27 ENCOUNTER — APPOINTMENT (OUTPATIENT)
Dept: PHYSICAL THERAPY | Facility: CLINIC | Age: 59
End: 2024-06-27
Payer: MEDICARE

## 2024-06-27 DIAGNOSIS — M54.16 RADICULOPATHY, LUMBAR REGION: ICD-10-CM

## 2024-06-27 DIAGNOSIS — M51.36 OTHER INTERVERTEBRAL DISC DEGENERATION, LUMBAR REGION: ICD-10-CM

## 2024-06-27 DIAGNOSIS — R20.2 PARESTHESIA OF SKIN: ICD-10-CM

## 2024-06-27 DIAGNOSIS — R20.0 ANESTHESIA OF SKIN: ICD-10-CM

## 2024-06-27 DIAGNOSIS — M47.816 SPONDYLOSIS WITHOUT MYELOPATHY OR RADICULOPATHY, LUMBAR REGION: ICD-10-CM

## 2024-06-27 PROCEDURE — 72131 CT LUMBAR SPINE W/O DYE: CPT

## 2024-10-22 ENCOUNTER — HOSPITAL ENCOUNTER (EMERGENCY)
Facility: HOSPITAL | Age: 59
Discharge: HOME | End: 2024-10-22
Attending: EMERGENCY MEDICINE
Payer: MEDICARE

## 2024-10-22 VITALS
BODY MASS INDEX: 32.72 KG/M2 | TEMPERATURE: 97.3 F | WEIGHT: 254.85 LBS | RESPIRATION RATE: 16 BRPM | OXYGEN SATURATION: 99 % | SYSTOLIC BLOOD PRESSURE: 149 MMHG | DIASTOLIC BLOOD PRESSURE: 91 MMHG | HEART RATE: 74 BPM

## 2024-10-22 DIAGNOSIS — S80.211A ABRASION, KNEE, RIGHT, INITIAL ENCOUNTER: Primary | ICD-10-CM

## 2024-10-22 PROCEDURE — 90715 TDAP VACCINE 7 YRS/> IM: CPT | Performed by: EMERGENCY MEDICINE

## 2024-10-22 PROCEDURE — 90471 IMMUNIZATION ADMIN: CPT

## 2024-10-22 PROCEDURE — 99282 EMERGENCY DEPT VISIT SF MDM: CPT

## 2024-10-22 RX ORDER — CEPHALEXIN 500 MG/1
500 CAPSULE ORAL EVERY 8 HOURS SCHEDULED
Qty: 21 CAPSULE | Refills: 0 | Status: SHIPPED | OUTPATIENT
Start: 2024-10-22 | End: 2024-10-29

## 2024-10-22 RX ORDER — BACITRACIN, NEOMYCIN, POLYMYXIN B 400; 3.5; 5 [USP'U]/G; MG/G; [USP'U]/G
1 OINTMENT TOPICAL ONCE
Status: COMPLETED | OUTPATIENT
Start: 2024-10-22 | End: 2024-10-22

## 2024-10-22 RX ADMIN — BACITRACIN ZINC, NEOMYCIN, POLYMYXIN B 1 LARGE APPLICATION: 400; 3.5; 5 OINTMENT TOPICAL at 20:21

## 2024-10-22 RX ADMIN — CEPHALEXIN 500 MG: 250 CAPSULE ORAL at 20:01

## 2024-10-22 RX ADMIN — TETANUS TOXOID, REDUCED DIPHTHERIA TOXOID AND ACELLULAR PERTUSSIS VACCINE, ADSORBED 0.5 ML: 5; 2.5; 8; 8; 2.5 SUSPENSION INTRAMUSCULAR at 20:00

## 2024-10-22 NOTE — ED PROVIDER NOTES
Time reflects when diagnosis was documented in both MDM as applicable and the Disposition within this note       Time User Action Codes Description Comment    10/22/2024  7:41 PM Sohan Perez Add [S80.211A] Abrasion, knee, right, initial encounter           ED Disposition       ED Disposition   Discharge    Condition   Stable    Date/Time   Tue Oct 22, 2024  8:00 PM    Comment   Steve Stevenson discharge to home/self care.                   Assessment & Plan       Medical Decision Making  Will scrub the wound and cleanse and remove any devitalized tissue.  No active bleeding at this time.  The wound is not deep enough for sutures at this time.  Patient's tetanus will be updated to be given Keflex in the emergency department.    Wound care-chlorhexidine was used to cleanse the area after saline gauze was placed to rehydrate the skin in the area.  The area was then scrubbed with chlorhexidine and gauze and irrigated copiously with sterile water.  2 areas of denuded skin were sharply debrided.  The wound did not have any significant depth to it requiring repair.  Wound was then dressed by the nursing staff with bacitracin and a bandage.  Discharge directions provided to the patient.    Risk  Prescription drug management.             Medications   neomycin-bacitracin-polymyxin b (NEOSPORIN) ointment 1 large application (has no administration in time range)   cephalexin (KEFLEX) capsule 500 mg (500 mg Oral Given 10/22/24 2001)   tetanus-diphtheria-acellular pertussis (BOOSTRIX) IM injection 0.5 mL (0.5 mL Intramuscular Given 10/22/24 2000)       ED Risk Strat Scores                           SBIRT 20yo+      Flowsheet Row Most Recent Value   Initial Alcohol Screen: US AUDIT-C     1. How often do you have a drink containing alcohol? 0 Filed at: 10/22/2024 1829   2. How many drinks containing alcohol do you have on a typical day you are drinking?  0 Filed at: 10/22/2024 1829   3a. Male UNDER 65: How often do you have  five or more drinks on one occasion? 0 Filed at: 10/22/2024 1829   Audit-C Score 0 Filed at: 10/22/2024 1829   ELIZABETH: How many times in the past year have you...    Used an illegal drug or used a prescription medication for non-medical reasons? Never Filed at: 10/22/2024 1829                            History of Present Illness       Chief Complaint   Patient presents with    Extremity Laceration     Patient presents to the ED with complaints of a laceration to the right knee that occurred today. He states he fell on a piece of aluminum. He states last tetanus vaccine was a few years ago.        Past Medical History:   Diagnosis Date    Parkinson disease (HCC)     Skin cancer       Past Surgical History:   Procedure Laterality Date    DEEP BRAIN STIMULATOR PLACEMENT        History reviewed. No pertinent family history.   Social History     Tobacco Use    Smoking status: Never    Smokeless tobacco: Never   Substance Use Topics    Alcohol use: Yes    Drug use: Never      E-Cigarette/Vaping      E-Cigarette/Vaping Substances      I have reviewed and agree with the history as documented.     Patient slipped and fell on a piece of corrugated sheet-metal.  He sustained a deep abrasion/laceration to the right knee.  He did not wash it off he came right to the emergency room he is able to ambulate without difficulty.  He does not know if he is up-to-date on tetanus.  He has a deep brain stimulator in and is concerned that he might need antibiotics if he gets an infection.      History provided by:  Patient      Review of Systems   Skin:  Positive for wound.   All other systems reviewed and are negative.          Objective       ED Triage Vitals [10/22/24 1827]   Temperature Pulse Blood Pressure Respirations SpO2 Patient Position - Orthostatic VS   (!) 97.3 °F (36.3 °C) 88 166/94 16 95 % Lying      Temp Source Heart Rate Source BP Location FiO2 (%) Pain Score    Temporal Monitor Right arm -- No Pain      Vitals      Date and  Time Temp Pulse SpO2 Resp BP Pain Score FACES Pain Rating User   10/22/24 1827 97.3 °F (36.3 °C) 88 95 % 16 166/94 No Pain -- RR            Physical Exam  Vitals and nursing note reviewed.   Constitutional:       General: He is not in acute distress.     Appearance: Normal appearance. He is well-developed. He is not ill-appearing or toxic-appearing.      Comments: Parkinsonian with a tremor   HENT:      Head: Normocephalic and atraumatic.      Right Ear: External ear normal.      Left Ear: External ear normal.      Nose: Nose normal.      Mouth/Throat:      Mouth: Mucous membranes are moist.   Eyes:      Conjunctiva/sclera: Conjunctivae normal.   Cardiovascular:      Heart sounds: No murmur heard.  Pulmonary:      Effort: Pulmonary effort is normal. No respiratory distress.   Abdominal:      Tenderness: There is no abdominal tenderness.   Musculoskeletal:         General: No swelling.      Cervical back: Normal range of motion and neck supple.      Comments: Moderately deep abrasion to the right distal patellar area.  Distal sensation and strength is intact.  Extensor mechanism is intact.  No significant tenderness or swelling.  There is some denuded skin and the wound and the area surrounding it is significantly dirty with house dirt.   Skin:     General: Skin is warm and dry.      Capillary Refill: Capillary refill takes less than 2 seconds.   Neurological:      General: No focal deficit present.      Mental Status: He is alert and oriented to person, place, and time. Mental status is at baseline.   Psychiatric:         Mood and Affect: Mood normal.         Behavior: Behavior normal.         Results Reviewed       None            No orders to display       Procedures    ED Medication and Procedure Management   Prior to Admission Medications   Prescriptions Last Dose Informant Patient Reported? Taking?   Amantadine HCl 100 MG tablet   Yes No   Sig: Take 100 mg by mouth 2 (two) times a day   diclofenac sodium  (VOLTAREN) 50 mg EC tablet   Yes No   Sig: Take 50 mg by mouth   famotidine (PEPCID) 20 mg tablet   Yes No   Sig: Take 1 tablet by mouth 2 (two) times a day      Facility-Administered Medications: None     Patient's Medications   Discharge Prescriptions    CEPHALEXIN (KEFLEX) 500 MG CAPSULE    Take 1 capsule (500 mg total) by mouth every 8 (eight) hours for 7 days       Start Date: 10/22/2024End Date: 10/29/2024       Order Dose: 500 mg       Quantity: 21 capsule    Refills: 0     No discharge procedures on file.  ED SEPSIS DOCUMENTATION   Time reflects when diagnosis was documented in both MDM as applicable and the Disposition within this note       Time User Action Codes Description Comment    10/22/2024  7:41 PM Sohan Perez Add [S80.211A] Abrasion, knee, right, initial encounter                  Sohan Preez MD  10/22/24 2002